# Patient Record
Sex: MALE | Race: WHITE | NOT HISPANIC OR LATINO | Employment: OTHER | URBAN - METROPOLITAN AREA
[De-identification: names, ages, dates, MRNs, and addresses within clinical notes are randomized per-mention and may not be internally consistent; named-entity substitution may affect disease eponyms.]

---

## 2023-02-22 LAB
BASOPHILS (10*3/UL) IN BLOOD BY AUTOMATED COUNT: 0.05 X10E9/L (ref 0–0.1)
BASOPHILS/100 LEUKOCYTES IN BLOOD BY AUTOMATED COUNT: 0.7 % (ref 0–2)
D-DIMER, QUANTITATIVE VTE EXCLUSION: 2022 NG/ML FEU
EOSINOPHILS (10*3/UL) IN BLOOD BY AUTOMATED COUNT: 0.21 X10E9/L (ref 0–0.4)
EOSINOPHILS/100 LEUKOCYTES IN BLOOD BY AUTOMATED COUNT: 3.1 % (ref 0–6)
ERYTHROCYTE DISTRIBUTION WIDTH (RATIO) BY AUTOMATED COUNT: 13.7 % (ref 11.5–14.5)
ERYTHROCYTE MEAN CORPUSCULAR HEMOGLOBIN CONCENTRATION (G/DL) BY AUTOMATED: 32.4 G/DL (ref 32–36)
ERYTHROCYTE MEAN CORPUSCULAR VOLUME (FL) BY AUTOMATED COUNT: 91 FL (ref 80–100)
ERYTHROCYTES (10*6/UL) IN BLOOD BY AUTOMATED COUNT: 4.74 X10E12/L (ref 4.5–5.9)
HEMATOCRIT (%) IN BLOOD BY AUTOMATED COUNT: 43.2 % (ref 41–52)
HEMOGLOBIN (G/DL) IN BLOOD: 14 G/DL (ref 13.5–17.5)
IMMATURE GRANULOCYTES/100 LEUKOCYTES IN BLOOD BY AUTOMATED COUNT: 0.3 % (ref 0–0.9)
LEUKOCYTES (10*3/UL) IN BLOOD BY AUTOMATED COUNT: 6.8 X10E9/L (ref 4.4–11.3)
LYMPHOCYTES (10*3/UL) IN BLOOD BY AUTOMATED COUNT: 2.13 X10E9/L (ref 0.8–3)
LYMPHOCYTES/100 LEUKOCYTES IN BLOOD BY AUTOMATED COUNT: 31.5 % (ref 13–44)
MONOCYTES (10*3/UL) IN BLOOD BY AUTOMATED COUNT: 0.72 X10E9/L (ref 0.05–0.8)
MONOCYTES/100 LEUKOCYTES IN BLOOD BY AUTOMATED COUNT: 10.6 % (ref 2–10)
NEUTROPHILS (10*3/UL) IN BLOOD BY AUTOMATED COUNT: 3.64 X10E9/L (ref 1.6–5.5)
NEUTROPHILS/100 LEUKOCYTES IN BLOOD BY AUTOMATED COUNT: 53.8 % (ref 40–80)
NRBC (PER 100 WBCS) BY AUTOMATED COUNT: 0 /100 WBC (ref 0–0)
PLATELETS (10*3/UL) IN BLOOD AUTOMATED COUNT: 280 X10E9/L (ref 150–450)

## 2023-02-23 LAB
ALANINE AMINOTRANSFERASE (SGPT) (U/L) IN SER/PLAS: 16 U/L (ref 10–52)
ALBUMIN (G/DL) IN SER/PLAS: 4.3 G/DL (ref 3.4–5)
ALKALINE PHOSPHATASE (U/L) IN SER/PLAS: 63 U/L (ref 33–136)
ANION GAP IN SER/PLAS: 15 MMOL/L (ref 10–20)
ASPARTATE AMINOTRANSFERASE (SGOT) (U/L) IN SER/PLAS: 16 U/L (ref 9–39)
BILIRUBIN TOTAL (MG/DL) IN SER/PLAS: 0.5 MG/DL (ref 0–1.2)
CALCIUM (MG/DL) IN SER/PLAS: 9.2 MG/DL (ref 8.6–10.6)
CARBON DIOXIDE, TOTAL (MMOL/L) IN SER/PLAS: 25 MMOL/L (ref 21–32)
CHLORIDE (MMOL/L) IN SER/PLAS: 108 MMOL/L (ref 98–107)
COBALAMIN (VITAMIN B12) (PG/ML) IN SER/PLAS: 312 PG/ML (ref 211–911)
CREATININE (MG/DL) IN SER/PLAS: 1.03 MG/DL (ref 0.5–1.3)
GFR MALE: 70 ML/MIN/1.73M2
GLUCOSE (MG/DL) IN SER/PLAS: 96 MG/DL (ref 74–99)
POTASSIUM (MMOL/L) IN SER/PLAS: 5 MMOL/L (ref 3.5–5.3)
PROTEIN TOTAL: 6.8 G/DL (ref 6.4–8.2)
SODIUM (MMOL/L) IN SER/PLAS: 143 MMOL/L (ref 136–145)
THYROTROPIN (MIU/L) IN SER/PLAS BY DETECTION LIMIT <= 0.05 MIU/L: 2.13 MIU/L (ref 0.44–3.98)
TROPONIN I, HIGH SENSITIVITY: 9 NG/L (ref 0–53)
UREA NITROGEN (MG/DL) IN SER/PLAS: 15 MG/DL (ref 6–23)

## 2023-02-27 LAB
ALBUMIN ELP: 4 G/DL (ref 3.4–5)
ALPHA 1: 0.3 G/DL (ref 0.2–0.6)
ALPHA 2: 0.9 G/DL (ref 0.4–1.1)
BETA: 0.8 G/DL (ref 0.5–1.2)
GAMMA GLOBULIN: 0.8 G/DL (ref 0.5–1.4)
PATH REVIEW - SERUM IMMUNOFIXATION: NORMAL
PATH REVIEW-SERUM PROTEIN ELECTROPHORESIS: NORMAL
PROTEIN ELECTROPHORESIS INTERPRETATION: NORMAL
PROTEIN TOTAL: 6.8 G/DL (ref 6.4–8.2)
SERUM IMMUNOFIXATION INTERPRETATION: NORMAL

## 2023-03-27 ENCOUNTER — TELEPHONE (OUTPATIENT)
Dept: PRIMARY CARE | Facility: CLINIC | Age: 88
End: 2023-03-27

## 2023-06-10 LAB — PROSTATE SPECIFIC AG (NG/ML) IN SER/PLAS: 1.31 NG/ML (ref 0–4)

## 2023-07-12 ENCOUNTER — OFFICE VISIT (OUTPATIENT)
Dept: PRIMARY CARE | Facility: CLINIC | Age: 88
End: 2023-07-12
Payer: MEDICARE

## 2023-07-12 VITALS — DIASTOLIC BLOOD PRESSURE: 80 MMHG | SYSTOLIC BLOOD PRESSURE: 120 MMHG | HEART RATE: 80 BPM

## 2023-07-12 DIAGNOSIS — R06.2 WHEEZING: Primary | ICD-10-CM

## 2023-07-12 DIAGNOSIS — R05.3 CHRONIC COUGH: ICD-10-CM

## 2023-07-12 PROBLEM — R44.0 AUDITORY HALLUCINATION: Status: ACTIVE | Noted: 2023-07-12

## 2023-07-12 PROBLEM — E55.9 VITAMIN D DEFICIENCY: Status: ACTIVE | Noted: 2023-07-12

## 2023-07-12 PROBLEM — E53.8 VITAMIN B12 DEFICIENCY: Status: ACTIVE | Noted: 2023-07-12

## 2023-07-12 PROCEDURE — 99212 OFFICE O/P EST SF 10 MIN: CPT | Performed by: INTERNAL MEDICINE

## 2023-07-12 NOTE — PROGRESS NOTES
Subjective   Patient ID: Delfino Gomez is a 87 y.o. male who presents for follow-up visit    HPI   The patient reports a history of rhinorrhea, watery eyes, frequent blowing of the nose over the past 2 weeks or so.  He also reports associated paroxysms of nonproductive cough.  He reports continued chronic intermittent episodes of exertional wheezing-slightly increased in frequency over the past 2 weeks.  He reports no fever, chills, nasal congestion, purulent nasal discharge, postnasal drip, sneezing, sore throat, shortness of breath, chest pain, abdominal pain, nausea, vomiting, diarrhea, anorexia.  The patient frequently works in the yard and is exposed to a number of flowers.  He also at the recommendation of the his pharmacist has used blink eyedrops, Benadryl as needed and most recently loratadine 10 mg daily as needed.  He has not used any medication for the past 6 days.  He does report some improvement in rhinorrhea, watery eyes,, some decrease in nasal discharge, and a decrease frequency of the cough over the past few days  Review of Systems    Objective   There were no vitals taken for this visit.    Physical Exam  Head-palpation revealed no tenderness over the maxillary or frontal sinuses  Eyes-3 x 4 cm areas of swelling located below both eyes.  Extraocular movements intact.  Pupils equal react to light.  Fundi not well-visualized  Nose-turbinates not erythematous or swollen, mild nasal septal deviation noted to the right  Mouth-posterior pharynx not erythematous, tonsillar pillars appeared normal, no exudates  Neck-no lymphadenopathy.  Lungs-clear.  Cardiac--heart sounds distant, rate normal, rhythm regular, no murmurs, no JVD.  Abdomen-soft, nondistended. Normal active bowel sounds. Palpation revealed no tenderness or masses  Assessment/Plan        Assessment  Paroxysms of nonproductive cough, rhinorrhea, watery eyes-May be secondary to allergic rhinitis, viral syndrome.  I suppose bronchospasm may be a  contributing factor to the etiology of the cough.  Plan  Begin Zyrtec 10 mg p.o. nightly.  Begin Flonase Sensa mist 1 spray to each nostril twice daily  Begin Mucinex DM maximum strength 1 tablet p.o. twice daily as needed cough.  The patient will return for a follow-up visit in 2-week

## 2023-07-25 NOTE — PROGRESS NOTES
Subjective   Patient ID: Delfino Gomez is a 87 y.o. male who presents for follow-up visit    HPI   The patient reports continued paroxysms of nonproductive cough since his last office visit.  He reports that he will experience paroxysms of cough when lying down.  He reports much less rhinorrhea, less frequent blowing of the nose, less watering of eyes since his last office visit.  He reports fewer episodes of exertional wheezing since his last office visit.  No other associated symptoms.  He does report a decrease in the frequency of coughing but longer paroxysms over the past 2 weeks.  Note that the patient did not use Flonase nasal spray as I had recommended because the spray did not work.  He has remained on Zyrtec 10 mg p.o. every evening  Review of Systems    Objective   There were no vitals taken for this visit.    Physical Exam  Head-palpation revealed no tenderness over the maxillary or frontal sinuses  Eyes-3 x 4 cm areas of swelling located below both eyes.  Nose-turbinates not erythematous or swollen, mild nasal septal deviation noted to the right  Mouth-posterior pharynx not erythematous, tonsillar pillars appeared normal, no exudates  Neck-no lymphadenopathy.  Lungs-clear.  Cardiac--heart sounds distant, rate normal, rhythm regular, no murmurs, no JVD.  Abdomen-soft, nondistended. Normal active bowel sounds. Palpation revealed no tenderness or masses  Assessment/Plan         Assessment  Continued paroxysms of nonproductive cough, rhinorrhea, watery eyes-May be secondary to allergic rhinitis,   I suppose bronchospasm may be a contributing factor to the etiology of the cough.    Plan  I urged the patient to purchase Flonase Sensa mist and use 1 spray to each nostril twice daily.  I told him to continue use of Zyrtec 10 mg p.o. every evening.  He will return for a follow-up visit in 2 weeks

## 2023-07-26 ENCOUNTER — OFFICE VISIT (OUTPATIENT)
Dept: PRIMARY CARE | Facility: CLINIC | Age: 88
End: 2023-07-26
Payer: MEDICARE

## 2023-07-26 VITALS — DIASTOLIC BLOOD PRESSURE: 80 MMHG | SYSTOLIC BLOOD PRESSURE: 110 MMHG | WEIGHT: 206 LBS | HEART RATE: 76 BPM

## 2023-07-26 DIAGNOSIS — R05.2 SUBACUTE COUGH: Primary | ICD-10-CM

## 2023-07-26 PROCEDURE — 99212 OFFICE O/P EST SF 10 MIN: CPT | Performed by: INTERNAL MEDICINE

## 2023-08-07 NOTE — PROGRESS NOTES
Subjective   Patient ID: Delfino Gomez is a 88 y.o. male who presents for 2-week follow-up visit    HPI   The patient reports continued paroxysms of nonproductive cough since his last office visit.  He does report that he can experience paroxysms of cough when lying down.  He reports continued associated rhinorrhea-decreased since his last office visit.  He reports less watering of eyes since his last office visit.  He reports fewer episodes of exertional wheezing since his last office visit.  No other associated symptoms.  The patient reports a dramatic decrease in the frequency of cough over the past 2 weeks.  The patient however has been using Flonase spray 3 sprays in each nostril twice daily.  Review of Systems    Objective   There were no vitals taken for this visit.    Physical Exam  Head-palpation revealed no tenderness over the maxillary or frontal sinuses  Eyes-3 x 4 cm areas of swelling located below both eyes.  Nose-turbinates not erythematous or swollen, mild nasal septal deviation noted to the right  Mouth-posterior pharynx not erythematous, tonsillar pillars appeared normal, no exudates  Neck-no lymphadenopathy.  Lungs-clear.  Cardiac--heart sounds distant, rate normal, rhythm regular, no murmurs, no JVD.  Assessment/Plan          Assessment  Continued paroxysms of nonproductive cough, rhinorrhea, watery eyes-May be secondary to allergic rhinitis,   I suppose bronchospasm may be a contributing factor to the etiology of the cough.    Plan  I told the patient to decrease his Flonase dosage to 1 spray to each nostril twice daily and to continue use of Zyrtec 10 mg p.o. nightly until resolution of the cough.  The patient will call me if he notices an increase in the frequency of cough or increasing rhinorrhea, more frequent blowing of the nose etc.  He will return for an office visit as needed

## 2023-08-09 ENCOUNTER — OFFICE VISIT (OUTPATIENT)
Dept: PRIMARY CARE | Facility: CLINIC | Age: 88
End: 2023-08-09
Payer: MEDICARE

## 2023-08-09 VITALS — SYSTOLIC BLOOD PRESSURE: 130 MMHG | WEIGHT: 210 LBS | HEART RATE: 68 BPM | DIASTOLIC BLOOD PRESSURE: 78 MMHG

## 2023-08-09 DIAGNOSIS — R05.2 SUBACUTE COUGH: Primary | ICD-10-CM

## 2023-08-09 PROCEDURE — 99212 OFFICE O/P EST SF 10 MIN: CPT | Performed by: INTERNAL MEDICINE

## 2023-10-26 DIAGNOSIS — R01.1 HEART MURMUR: ICD-10-CM

## 2023-10-26 DIAGNOSIS — I50.32 CHRONIC DIASTOLIC CONGESTIVE HEART FAILURE (MULTI): Primary | ICD-10-CM

## 2023-11-09 PROBLEM — R79.89 D-DIMER, ELEVATED: Status: ACTIVE | Noted: 2023-11-09

## 2023-11-09 PROBLEM — I45.10 RIGHT BUNDLE BRANCH BLOCK (RBBB) DETERMINED BY ELECTROCARDIOGRAPHY: Status: ACTIVE | Noted: 2023-11-09

## 2023-11-09 PROBLEM — R06.09 DOE (DYSPNEA ON EXERTION): Status: ACTIVE | Noted: 2023-11-09

## 2023-11-09 PROBLEM — H93.13 SUBJECTIVE TINNITUS OF BOTH EARS: Status: ACTIVE | Noted: 2023-11-09

## 2023-11-09 PROBLEM — H93.19 TINNITUS: Status: ACTIVE | Noted: 2023-11-09

## 2023-11-09 PROBLEM — R07.9 CHEST PAIN: Status: ACTIVE | Noted: 2023-11-09

## 2023-11-09 PROBLEM — R42 DIZZINESS: Status: ACTIVE | Noted: 2023-11-09

## 2023-11-09 PROBLEM — H90.3 BILATERAL SENSORINEURAL HEARING LOSS: Status: ACTIVE | Noted: 2023-11-09

## 2023-11-09 PROBLEM — R44.3 HALLUCINATION: Status: ACTIVE | Noted: 2023-11-09

## 2023-11-09 PROBLEM — R60.0 LOWER LEG EDEMA: Status: ACTIVE | Noted: 2023-11-09

## 2023-11-09 RX ORDER — SILODOSIN 8 MG/1
1 CAPSULE ORAL EVERY 24 HOURS
COMMUNITY
Start: 2014-02-18

## 2023-11-09 RX ORDER — ERYTHROMYCIN 5 MG/G
1 OINTMENT OPHTHALMIC NIGHTLY
COMMUNITY
Start: 2023-09-14

## 2023-11-09 RX ORDER — FLUOCINONIDE 0.5 MG/G
OINTMENT TOPICAL
COMMUNITY
Start: 2020-10-15

## 2023-11-09 RX ORDER — CIPROFLOXACIN 250 MG/1
1 TABLET, FILM COATED ORAL EVERY 12 HOURS
COMMUNITY
Start: 2014-02-07

## 2023-11-09 RX ORDER — CLOBETASOL PROPIONATE 0.5 MG/G
CREAM TOPICAL 2 TIMES DAILY
COMMUNITY
Start: 2023-05-03

## 2023-11-09 RX ORDER — LIDOCAINE 560 MG/1
PATCH PERCUTANEOUS; TOPICAL; TRANSDERMAL DAILY PRN
COMMUNITY
Start: 2020-08-09

## 2023-11-09 RX ORDER — METOPROLOL SUCCINATE 50 MG/1
1 TABLET, EXTENDED RELEASE ORAL EVERY 24 HOURS
COMMUNITY
Start: 2014-01-28

## 2024-05-10 ENCOUNTER — OFFICE VISIT (OUTPATIENT)
Dept: CARDIOLOGY | Facility: CLINIC | Age: 89
End: 2024-05-10
Payer: MEDICARE

## 2024-05-10 VITALS
OXYGEN SATURATION: 96 % | HEART RATE: 85 BPM | BODY MASS INDEX: 31.83 KG/M2 | DIASTOLIC BLOOD PRESSURE: 80 MMHG | HEIGHT: 68 IN | SYSTOLIC BLOOD PRESSURE: 150 MMHG | WEIGHT: 210 LBS

## 2024-05-10 RX ORDER — PNV NO.95/FERROUS FUM/FOLIC AC 28MG-0.8MG
100 TABLET ORAL DAILY
COMMUNITY